# Patient Record
Sex: MALE | Race: WHITE | NOT HISPANIC OR LATINO | Employment: FULL TIME | URBAN - METROPOLITAN AREA
[De-identification: names, ages, dates, MRNs, and addresses within clinical notes are randomized per-mention and may not be internally consistent; named-entity substitution may affect disease eponyms.]

---

## 2017-01-26 ENCOUNTER — OFFICE VISIT (OUTPATIENT)
Dept: FAMILY MEDICINE | Facility: CLINIC | Age: 37
End: 2017-01-26
Payer: COMMERCIAL

## 2017-01-26 VITALS
OXYGEN SATURATION: 97 % | SYSTOLIC BLOOD PRESSURE: 120 MMHG | TEMPERATURE: 97.6 F | BODY MASS INDEX: 29.63 KG/M2 | HEIGHT: 70 IN | DIASTOLIC BLOOD PRESSURE: 76 MMHG | WEIGHT: 207 LBS | HEART RATE: 69 BPM

## 2017-01-26 DIAGNOSIS — Z23 NEED FOR PROPHYLACTIC VACCINATION AND INOCULATION AGAINST INFLUENZA: ICD-10-CM

## 2017-01-26 DIAGNOSIS — J40 BRONCHITIS: ICD-10-CM

## 2017-01-26 DIAGNOSIS — B36.0 TINEA VERSICOLOR: ICD-10-CM

## 2017-01-26 DIAGNOSIS — E78.5 HYPERLIPIDEMIA LDL GOAL <160: ICD-10-CM

## 2017-01-26 DIAGNOSIS — Z00.00 ROUTINE GENERAL MEDICAL EXAMINATION AT A HEALTH CARE FACILITY: Primary | ICD-10-CM

## 2017-01-26 DIAGNOSIS — B35.3 TINEA PEDIS OF BOTH FEET: ICD-10-CM

## 2017-01-26 LAB
CHOLEST SERPL-MCNC: 282 MG/DL
HDLC SERPL-MCNC: 52 MG/DL
LDLC SERPL CALC-MCNC: 200 MG/DL
NONHDLC SERPL-MCNC: 230 MG/DL
TRIGL SERPL-MCNC: 148 MG/DL

## 2017-01-26 PROCEDURE — 80061 LIPID PANEL: CPT | Performed by: FAMILY MEDICINE

## 2017-01-26 PROCEDURE — 36415 COLL VENOUS BLD VENIPUNCTURE: CPT | Performed by: FAMILY MEDICINE

## 2017-01-26 PROCEDURE — 99395 PREV VISIT EST AGE 18-39: CPT | Performed by: FAMILY MEDICINE

## 2017-01-26 PROCEDURE — 90471 IMMUNIZATION ADMIN: CPT | Performed by: FAMILY MEDICINE

## 2017-01-26 PROCEDURE — 90686 IIV4 VACC NO PRSV 0.5 ML IM: CPT | Performed by: FAMILY MEDICINE

## 2017-01-26 RX ORDER — FLUOCINONIDE 0.5 MG/G
CREAM TOPICAL 2 TIMES DAILY
Qty: 15 G | Refills: 0 | Status: SHIPPED | OUTPATIENT
Start: 2017-01-26 | End: 2020-05-27

## 2017-01-26 RX ORDER — KETOCONAZOLE 200 MG/1
200 TABLET ORAL DAILY
Qty: 14 TABLET | Refills: 0 | Status: SHIPPED | OUTPATIENT
Start: 2017-01-26 | End: 2020-05-27

## 2017-01-26 ASSESSMENT — PAIN SCALES - GENERAL: PAINLEVEL: NO PAIN (0)

## 2017-01-26 NOTE — PROGRESS NOTES
Injectable Influenza Immunization Documentation    1.  Is the person to be vaccinated sick today?  No    2. Does the person to be vaccinated have an allergy to eggs or to a component of the vaccine?  No    3. Has the person to be vaccinated today ever had a serious reaction to influenza vaccine in the past?  No    4. Has the person to be vaccinated ever had Guillain-[place] syndrome?  No     Form completed by Ralph DAVALOS      Per orders of Dr. MELO, injection of Influenza given by [facility]. Patient instructed to remain in clinic for 20 minutes afterwards, and to report any adverse reaction to me immediately.

## 2017-01-26 NOTE — PROGRESS NOTES
SUBJECTIVE:     CC: Ricardo Blevins is an 36 year old male who presents for preventative health visit.     Healthy Habits:    Do you get at least three servings of calcium containing foods daily (dairy, green leafy vegetables, etc.)? Most days    Amount of exercise or daily activities, outside of work: Pretty Active    Problems taking medications regularly No    Medication side effects: No    Have you had an eye exam in the past two years? yes    Do you see a dentist twice per year? yes    Do you have sleep apnea, excessive snoring or daytime drowsiness? Yes - Snores      Cold concerns    Today's PHQ-2 Score:   PHQ-2 ( 1999 Pf[employer]) 1/29/2016 3/4/2013   Q1: Little interest or pleasure in doing things 0 0   Q2: Feeling down, depressed or hopeless 0 0   PHQ-2 Score 0 0       Abuse: Current or Past(Physical, Sexual or Emotional)- No  Do you feel safe in your environment - Yes    Social History   Substance Use Topics     Smoking status: Never Smoker      Smokeless tobacco: Never Used     Alcohol Use: Yes      Comment: occ beer     The patient does not drink >3 drinks per day nor >7 drinks per week.    Last PSA: No results found for: PSA    Recent Labs   Lab Test  03/04/13   0919  10/24/11   0941   CHOL  236*  225*   HDL  50  52   LDL  170*  158*   TRIG  85  78   CHOLHDLRATIO  4.8  4.3       Reviewed orders with patient. Reviewed health maintenance and updated orders accordingly - Yes    All Histories reviewed and updated in Epic.  History reviewed. No pertinent past medical history.   Past Surgical History   Procedure Laterality Date     Appendectomy         ROS:  C: NEGATIVE for fever, chills, change in weight  I: NEGATIVE for worrisome rashes, moles or lesions  E: NEGATIVE for vision changes or irritation  ENT: NEGATIVE for ear, mouth and throat problems  R: NEGATIVE for significant cough or SOB  CV: NEGATIVE for chest pain, palpitations or peripheral edema  GI: NEGATIVE for nausea, abdominal pain, heartburn, or  change in bowel habits   male: negative for dysuria, hematuria, decreased urinary stream, erectile dysfunction, urethral discharge  M: NEGATIVE for significant arthralgias or myalgia  N: NEGATIVE for weakness, dizziness or paresthesias  P: NEGATIVE for changes in mood or affect    Problem list, Medication list, Allergies, and Medical/Social/Surgical histories reviewed in EPIC and updated as appropriate.  Labs reviewed in EPIC    BP Readings from Last 3 Encounters:   01/26/17 120/76   05/12/16 133/74   01/29/16 130/84    Wt Readings from Last 3 Encounters:   01/26/17 207 lb (93.895 kg)   05/12/16 215 lb (97.523 kg)   01/29/16 210 lb (95.255 kg)                  Patient Active Problem List   Diagnosis     Hyperlipidemia LDL goal <160     Past Surgical History   Procedure Laterality Date     Appendectomy         Social History   Substance Use Topics     Smoking status: Never Smoker      Smokeless tobacco: Never Used     Alcohol Use: Yes      Comment: occ beer     Family History   Problem Relation Age of Onset     Asthma Maternal Grandmother      CEREBROVASCULAR DISEASE Maternal Grandmother      Breast Cancer Maternal Grandmother      Genitourinary Problems Paternal Grandfather      Glaucoma No family hx of      Macular Degeneration No family hx of      CANCER No family hx of      DIABETES No family hx of      Hypertension No family hx of      Thyroid Disease No family hx of          Current Outpatient Prescriptions   Medication Sig Dispense Refill     clindamycin (CLINDAMAX) 1 % gel Apply topically 2 times daily 60 g 0     tobramycin (TOBREX) 0.3 % OINT ophthalmic ointment Apply 1/2 inch ribbon of ointment 3.5 g 0     fluocinonide (LIDEX) 0.05 % cream Apply topically 2 times daily 15 g 0     Calcium Carbonate-Vit D-Min (CALCIUM 1200 PO) Take  by mouth.       multivitamin, therapeutic with minerals (MULTI-VITAMIN) TABS Take 1 tablet by mouth daily.       ketoconazole (NIZORAL) 200 MG tablet Take 1 tablet by mouth  "daily. 14 tablet 0     No Known Allergies  OBJECTIVE:     /76 mmHg  Pulse 69  Temp(Src) 97.6  F (36.4  C) (Oral)  Ht 5' 10.25\" (1.784 m)  Wt 207 lb (93.895 kg)  BMI 29.50 kg/m2  SpO2 97%  EXAM:  GENERAL: healthy, alert and no distress  EYES: Eyes grossly normal to inspection, PERRL and conjunctivae and sclerae normal  HENT: ear canals and TM's normal, nose and mouth without ulcers or lesions  NECK: no adenopathy, no asymmetry, masses, or scars and thyroid normal to palpation  RESP: lungs clear to auscultation - no rales, rhonchi or wheezes  CV: regular rate and rhythm, normal S1 S2, no S3 or S4, no murmur, click or rub, no peripheral edema and peripheral pulses strong  ABDOMEN: soft, nontender, no hepatosplenomegaly, no masses and bowel sounds normal  MS: no gross musculoskeletal defects noted, no edema  SKIN: no suspicious lesions or rashes  NEURO: Normal strength and tone, mentation intact and speech normal  PSYCH: mentation appears normal, affect normal/bright    ASSESSMENT/PLAN:         ICD-10-CM    1. Routine general medical examination at a health care facility Z00.00    2. Hyperlipidemia LDL goal <160 E78.5 Lipid panel reflex to direct LDL   3. Tinea versicolor B36.0 ketoconazole (NIZORAL) 200 MG tablet     fluocinonide (LIDEX) 0.05 % cream   4. Tinea pedis of both feet B35.3 fluocinonide (LIDEX) 0.05 % cream   5. Bronchitis J40        If patient calls ok for prednisone for 5 days     COUNSELING:  Reviewed preventive health counseling, as reflected in patient instructions       Regular exercise       Healthy diet/nutrition         reports that he has never smoked. He has never used smokeless tobacco.    Estimated body mass index is 30.85 kg/(m^2) as calculated from the following:    Height as of 1/29/16: 5' 10\" (1.778 m).    Weight as of 5/12/16: 215 lb (97.523 kg).       Counseling Resources:  ATP IV Guidelines  Pooled Cohorts Equation Calculator  FRAX Risk Assessment  ICSI Preventive " Guidelines  Dietary Guidelines for Americans, 2010  USDA's MyPlate  ASA Prophylaxis  Lung CA Screening    Umer Thomas MD  [facility]

## 2017-01-26 NOTE — MR AVS SNAPSHOT
After Visit Summary   1/26/2017    Ricardo Blevins    MRN: [id number]           Patient Information     Date Of Birth          1980        Visit Information        Provider Department      1/26/2017 8:40 AM Umer Thomas MD [facility]        Today's Diagnoses     Routine general medical examination at a health care facility    -  1     Hyperlipidemia LDL goal <160         Tinea versicolor         Tinea pedis of both feet         Bronchitis           Care Instructions      Preventive Health Recommendations  Male Ages 26 - 39    Yearly exam:             See your health care provider every year in order to  o   Review health changes.   o   Discuss preventive care.    o   Review your medicines if your doctor has prescribed any.    You should be tested each year for STDs (sexually transmitted diseases), if you re at risk.     After age 35, talk to your provider about cholesterol testing. If you are at risk for heart disease, have your cholesterol tested at least every 5 years.     If you are at risk for diabetes, you should have a diabetes test (fasting glucose).  Shots: Get a flu shot each year. Get a tetanus shot every 10 years.     Nutrition:    Eat at least 5 servings of fruits and vegetables daily.     Eat whole-grain bread, whole-wheat pasta and brown rice instead of white grains and rice.     Talk to your provider about Calcium and Vitamin D.     Lifestyle    Exercise for at least 150 minutes a week (30 minutes a day, 5 days a week). This will help you control your weight and prevent disease.     Limit alcohol to one drink per day.     No smoking.     Wear sunscreen to prevent skin cancer.     See your dentist every six months for an exam and cleaning.             Follow-ups after your visit        Who to contact     If you have questions or need follow up information about today's clinic visit or your schedule please contact [facility]  "directly at [phone].  Normal or non-critical lab and imaging results will be communicated to you by MyChart, letter or phone within 4 business days after the clinic has received the results. If you do not hear from us within 7 days, please contact the clinic through Shopseen[employer] or phone. If you have a critical or abnormal lab result, we will notify you by phone as soon as possible.  Submit refill requests through iCAD or call your pharmacy and they will forward the refill request to us. Please allow 3 business days for your refill to be completed.          Additional Information About Your Visit        Shopseenhart Information     iCAD gives you secure access to your electronic health record. If you see a primary care provider, you can also send messages to your care team and make appointments. If you have questions, please call your primary care clinic.  If you do not have a primary care provider, please call [phone] and they will assist you.        Care EveryWhere ID     This is your Care EveryWhere ID. This could be used by other organizations to access your [facility] medical records  [id number]        Your Vitals Were     Pulse Temperature Height BMI (Body Mass Index) Pulse Oximetry       69 97.6  F (36.4  C) (Oral) 5' 10.25\" (1.784 m) 29.50 kg/m2 97%        Blood Pressure from Last 3 Encounters:   01/26/17 120/76   05/12/16 133/74   01/29/16 130/84    Weight from Last 3 Encounters:   01/26/17 207 lb (93.895 kg)   05/12/16 215 lb (97.523 kg)   01/29/16 210 lb (95.255 kg)              We Performed the Following     Lipid panel reflex to direct LDL          Where to get your medicines      These medications were sent to [facility] - [place], [place] - [address]  [address], [facility] [address]     Phone:  [phone]    - fluocinonide 0.05 % cream  - ketoconazole 200 MG tablet       Primary Care Provider Office Phone # Fax #    Umer Thomas MD " [phone]       [facility] [address]  [facility] [address]        Thank you!     Thank you for choosing [facility]  for your care. Our goal is always to provide you with excellent care. Hearing back from our patients is one way we can continue to improve our services. Please take a few minutes to complete the written survey that you may receive in the mail after your visit with us. Thank you!             Your Updated Medication List - Protect others around you: Learn how to safely use, store and throw away your medicines at www.disposemymeds.org.          This list is accurate as of: 1/26/17  9:40 AM.  Always use your most recent med list.                   Brand Name Dispense Instructions for use    CALCIUM 1200 PO      Take  by mouth.       clindamycin 1 % topical gel    CLINDAMAX    60 g    Apply topically 2 times daily       fluocinonide 0.05 % cream    LIDEX    15 g    Apply topically 2 times daily       ketoconazole 200 MG tablet    NIZORAL    14 tablet    Take 1 tablet (200 mg) by mouth daily       Multi-vitamin Tabs tablet      Take 1 tablet by mouth daily.       tobramycin 0.3 % Oint ophthalmic ointment    TOBREX    3.5 g    Apply 1/2 inch ribbon of ointment

## 2017-01-26 NOTE — NURSING NOTE
"Chief Complaint   Patient presents with     Physical       Initial /76 mmHg  Pulse 69  Temp(Src) 97.6  F (36.4  C) (Oral)  Ht 5' 10.25\" (1.784 m)  Wt 207 lb (93.895 kg)  BMI 29.50 kg/m2  SpO2 97% Estimated body mass index is 29.5 kg/(m^2) as calculated from the following:    Height as of this encounter: 5' 10.25\" (1.784 m).    Weight as of this encounter: 207 lb (93.895 kg).  BP completed using cuff size: regular  Ralph DAVALOS      "

## 2017-01-27 NOTE — PROGRESS NOTES
Quick Note:    Your cholesterols have gone up to their highest level yet.   Based on the pattern, I think you should go on cholesterol lowering meds. These values are very high. As you get older these put you at substantial risk of heart disease. Call if you agree with starting meds. Recheck will depend on what we decide to do.  ______

## 2017-01-31 ENCOUNTER — TELEPHONE (OUTPATIENT)
Dept: FAMILY MEDICINE | Facility: CLINIC | Age: 37
End: 2017-01-31

## 2017-01-31 DIAGNOSIS — J20.9 ACUTE BRONCHITIS, UNSPECIFIED ORGANISM: Primary | ICD-10-CM

## 2017-01-31 RX ORDER — PREDNISONE 20 MG/1
60 TABLET ORAL DAILY
Qty: 15 TABLET | Refills: 0 | Status: SHIPPED | OUTPATIENT
Start: 2017-01-31 | End: 2022-04-19

## 2017-01-31 NOTE — TELEPHONE ENCOUNTER
Notified patient of the message below per PCP.  Patient stated understanding and agreeable with the plan of care. Ene Stoll RN CPC Triage.

## 2017-01-31 NOTE — TELEPHONE ENCOUNTER
Patient came into pharmacy and stated that if he was not feeling better PCP would send in a script for Prednisone.    See OV 1/26/17.    1.  Routine general medical examination at a health care facility  Z00.00      2.  Hyperlipidemia LDL goal <160  E78.5  Lipid panel reflex to direct LDL    3.  Tinea versicolor  B36.0  ketoconazole (NIZORAL) 200 MG tablet        fluocinonide (LIDEX) 0.05 % cream    4.  Tinea pedis of both feet  B35.3  fluocinonide (LIDEX) 0.05 % cream    5.  Bronchitis  J40          If patient calls ok for prednisone for 5 days            Ene Stoll, RN CPC Triage.      Routed to PCP to advise

## 2020-02-23 ENCOUNTER — HEALTH MAINTENANCE LETTER (OUTPATIENT)
Age: 40
End: 2020-02-23

## 2020-05-27 DIAGNOSIS — B36.0 TINEA VERSICOLOR: ICD-10-CM

## 2020-05-27 DIAGNOSIS — B35.3 TINEA PEDIS OF BOTH FEET: ICD-10-CM

## 2020-05-27 RX ORDER — FLUOCINONIDE 0.5 MG/G
CREAM TOPICAL 2 TIMES DAILY
Qty: 15 G | Refills: 0 | Status: SHIPPED | OUTPATIENT
Start: 2020-05-27 | End: 2022-04-19

## 2020-05-27 RX ORDER — KETOCONAZOLE 200 MG/1
200 TABLET ORAL DAILY
Qty: 14 TABLET | Refills: 0 | Status: SHIPPED | OUTPATIENT
Start: 2020-05-27 | End: 2022-04-19

## 2020-11-16 ENCOUNTER — VIRTUAL VISIT (OUTPATIENT)
Dept: FAMILY MEDICINE | Facility: CLINIC | Age: 40
End: 2020-11-16
Payer: COMMERCIAL

## 2020-11-16 ENCOUNTER — TELEPHONE (OUTPATIENT)
Dept: FAMILY MEDICINE | Facility: CLINIC | Age: 40
End: 2020-11-16

## 2020-11-16 DIAGNOSIS — A69.20 ERYTHEMA MIGRANS (LYME DISEASE): Primary | ICD-10-CM

## 2020-11-16 PROCEDURE — 99213 OFFICE O/P EST LOW 20 MIN: CPT | Mod: TEL | Performed by: PHYSICIAN ASSISTANT

## 2020-11-16 RX ORDER — DOXYCYCLINE 100 MG/1
100 CAPSULE ORAL 2 TIMES DAILY
Qty: 42 CAPSULE | Refills: 0 | Status: SHIPPED | OUTPATIENT
Start: 2020-11-16 | End: 2020-12-07

## 2020-11-16 NOTE — TELEPHONE ENCOUNTER
Patient has not been seen since 2017.  He needs a visit.  Patient does not have access anymore for his mychart.  Virtual visit made with Griselda Quinteros.    Patient stated understanding and agreeable with the plan of care. Ene KITCHEN-RN.

## 2020-11-16 NOTE — TELEPHONE ENCOUNTER
Reason for call:  Patient reporting a symptom    Symptom or request: tick bite    Duration (how long have symptoms been present): removed burrowed tick yesterday    Have you been treated for this before? Yes    Additional comments: Patient noticed a burrowed in tick on hip/behind area yesterday and removed tick. Patient advised full tick was removed. Bite spot has redness and welt, also is tender to touch. Patient has no other symptoms. Patient is concerned about Lyme disease and is wondering about antibiotics to treat the bite.     Phone Number patient can be reached at:  Cell number on file:    Telephone Information:   Mobile [phone]       Best Time:  anytime    Can we leave a detailed message on this number:  YES    Call taken on 11/16/2020 at 9:46 AM by Scot Collier

## 2020-11-16 NOTE — PATIENT INSTRUCTIONS
Patient Education     Lyme Disease  Lyme disease is caused by bacteria. The infection is most often passed during the bite of a deer tick. The tick is very small, so many people with Lyme disease don't know they have been bitten. Tests for Lyme disease are not always accurate early in the disease. If the disease is suspected, treatment may start before testing confirms the infection. A long course of antibiotics is the standard treatment.  If untreated, Lyme disease can cause symptoms in many parts of the body that may worsen.    Early symptoms limited to a small area may appear within a few days to a month after the tick bite. These symptoms may include a round, red rash that sometimes looks like a bull's-eye target with darker outer ring and a darker center. There may fever, chills, fatigue, body aches, and headache. In time, the rash goes away, even without treatment. That doesn't mean the infection has gone away, however. In some cases, early local symptoms never develop.    Early body-wide symptoms may appear weeks to months after the bite. These can include rashes on the skin of various parts of the body, muscle aches, fatigue, fever, headache, stiff neck, weakness on one side of the face, dizziness, palpitations, passing out, and joint pain and swelling.    Late-stage symptoms can include weakness in an arm, or leg, headache, fever, and numbness and tingling in the arms or legs, joint pain and swelling, confusion, and memory loss.    Many people will have left over symptoms even after treatment and cure of the Lyme disease. These are called post-Lyme symptoms and may include fatigue, body aches, joint aches, and headaches, which generally improve with time. Repeated courses of antibiotics don't help these symptoms to resolve faster. And, having the symptoms after a course of treatment does not mean that the Lyme bacteria is still active in the body.  Testing is done to check for the bacteria. When the  infection is treated early, it can be cured. In some cases, a second or third course of antibiotics may be needed. Be sure to follow your healthcare providers directions about treatment.  Home care  If antibiotic pills have been prescribed, take them exactly as directed until they are completely gone. Don't stop taking them until you have taken the full course or your healthcare provider has told you to stop.  Ask your healthcare provider about taking over-the-counter medicines to control symptoms such as aches and fever.  Follow-up care  Follow up with your healthcare provider, or as advised. Be sure to return for follow-up testing as directed to be sure the infection has been treated.  When to seek medical advice  Call your healthcare provider right away if any of the following occur:    Current symptoms get worse    Unexplained fever, neck pain or stiffness, or headache    Arm, leg or facial weakness    Joint pain or swelling    Numbness and tingling in the arms or legs    Confusion or memory loss    Irregular or rapid heartbeat, palpitations, dizziness, or passing out  [employer] last reviewed this educational content on 3/1/2018    3954-5916 [employer]. [address] [place] [address], [place] [address]. All rights reserved. This information is not intended as a substitute for professional medical care. Always follow your healthcare professional's instructions.

## 2020-11-16 NOTE — PROGRESS NOTES
"Ricardo Blevins is a 40 year old male who is being evaluated via a billable telephone visit.      The patient has been notified of following:     \"This telephone visit will be conducted via a call between you and your physician/provider. We have found that certain health care needs can be provided without the need for a physical exam.  This service lets us provide the care you need with a short phone conversation.  If a prescription is necessary we can send it directly to your pharmacy.  If lab work is needed we can place an order for that and you can then stop by our lab to have the test done at a later time.    Telephone visits are billed at different rates depending on your insurance coverage. During this emergency period, for some insurers they may be billed the same as an in-person visit.  Please reach out to your insurance provider with any questions.    If during the course of the call the physician/provider feels a telephone visit is not appropriate, you will not be charged for this service.\"    Patient has given verbal consent for Telephone visit?  Yes    What phone number would you like to be contacted at? [phone]    How would you like to obtain your AVS? MyChart    Subjective     Ricardo Blevins is a 40 year old male who presents via phone visit today for the following health issues:    HPI        Concern - Tick Bite  Onset: patient is unsure how long it had been there. Patient been around deer 2-3 days ago.   Description: Patient found tick yesterday and removed it. There is a welt and ring around the bite.   Intensity: mild  Progression of Symptoms:  same  Accompanying Signs & Symptoms: welt, reddish/purlish ring around welt  Previous history of similar problem: No, patient has had tick bites before  Precipitating factors:        Worsened by: nothing  Alleviating factors:        Improved by: nothing  Therapies tried and outcome: nothing    Patient was hunting last week then butchering deer a few days " later. He did see ticks when he was out hunting, but didn't notice anything until Friday. Right hip/buttocks area. Patient removed engorged tick - feels he removed the entire tick. Red ring around where tic was, continues to be mildly sore.  Denies fever or chills.              Review of Systems   Constitutional, HEENT, cardiovascular, pulmonary, gi and gu systems are negative, except as otherwise noted.       Objective          Vitals:  No vitals were obtained today due to virtual visit.    healthy, alert and no distress  PSYCH: Alert and oriented times 3; coherent speech, normal   rate and volume, able to articulate logical thoughts, able   to abstract reason, no tangential thoughts, no hallucinations   or delusions  His affect is normal  RESP: No cough, no audible wheezing, able to talk in full sentences  Remainder of exam unable to be completed due to telephone visits            Assessment/Plan:    Assessment & Plan     Erythema migrans (Lyme disease)  Will treat as lyme disease due to presence of erythema migrans and visible engorged tick removed from patients hip. Advised of side effects of medication. Patient has no further questions.   - doxycycline hyclate (VIBRAMYCIN) 100 MG capsule; Take 1 capsule (100 mg) by mouth 2 times daily for 21 days        Return if symptoms worsen or fail to improve.    Griselda Quinteros PA-C  [employer] [facility]    Phone call duration:  12 minutes

## 2020-12-12 ENCOUNTER — HEALTH MAINTENANCE LETTER (OUTPATIENT)
Age: 40
End: 2020-12-12

## 2021-04-11 ENCOUNTER — HEALTH MAINTENANCE LETTER (OUTPATIENT)
Age: 41
End: 2021-04-11

## 2021-09-26 ENCOUNTER — HEALTH MAINTENANCE LETTER (OUTPATIENT)
Age: 41
End: 2021-09-26

## 2022-04-19 ENCOUNTER — OFFICE VISIT (OUTPATIENT)
Dept: FAMILY MEDICINE | Facility: CLINIC | Age: 42
End: 2022-04-19
Payer: COMMERCIAL

## 2022-04-19 VITALS
HEART RATE: 69 BPM | BODY MASS INDEX: 32.3 KG/M2 | HEIGHT: 70 IN | OXYGEN SATURATION: 96 % | SYSTOLIC BLOOD PRESSURE: 121 MMHG | DIASTOLIC BLOOD PRESSURE: 82 MMHG | TEMPERATURE: 98 F | WEIGHT: 225.6 LBS

## 2022-04-19 DIAGNOSIS — Z11.59 NEED FOR HEPATITIS C SCREENING TEST: ICD-10-CM

## 2022-04-19 DIAGNOSIS — E78.5 HYPERLIPIDEMIA LDL GOAL <160: ICD-10-CM

## 2022-04-19 DIAGNOSIS — Z23 NEED FOR VACCINATION: ICD-10-CM

## 2022-04-19 DIAGNOSIS — Z00.00 WELL ADULT EXAM: Primary | ICD-10-CM

## 2022-04-19 DIAGNOSIS — Z23 HIGH PRIORITY FOR 2019-NCOV VACCINE: ICD-10-CM

## 2022-04-19 DIAGNOSIS — Z11.4 SCREENING FOR HIV (HUMAN IMMUNODEFICIENCY VIRUS): ICD-10-CM

## 2022-04-19 DIAGNOSIS — Z23 NEED FOR VACCINE FOR DT (DIPHTHERIA-TETANUS): ICD-10-CM

## 2022-04-19 DIAGNOSIS — Z13.220 SCREENING FOR HYPERLIPIDEMIA: ICD-10-CM

## 2022-04-19 LAB
ALBUMIN SERPL-MCNC: 4.4 G/DL (ref 3.4–5)
ALP SERPL-CCNC: 64 U/L (ref 40–150)
ALT SERPL W P-5'-P-CCNC: 69 U/L (ref 0–70)
ANION GAP SERPL CALCULATED.3IONS-SCNC: 5 MMOL/L (ref 3–14)
AST SERPL W P-5'-P-CCNC: 33 U/L (ref 0–45)
BILIRUB SERPL-MCNC: 0.5 MG/DL (ref 0.2–1.3)
BUN SERPL-MCNC: 17 MG/DL (ref 7–30)
CALCIUM SERPL-MCNC: 9.9 MG/DL (ref 8.5–10.1)
CHLORIDE BLD-SCNC: 105 MMOL/L (ref 94–109)
CHOLEST SERPL-MCNC: 287 MG/DL
CO2 SERPL-SCNC: 27 MMOL/L (ref 20–32)
CREAT SERPL-MCNC: 0.95 MG/DL (ref 0.66–1.25)
FASTING STATUS PATIENT QL REPORTED: YES
GFR SERPL CREATININE-BSD FRML MDRD: >90 ML/MIN/1.73M2
GLUCOSE BLD-MCNC: 103 MG/DL (ref 70–99)
HBA1C MFR BLD: 5.7 % (ref 0–5.6)
HDLC SERPL-MCNC: 51 MG/DL
LDLC SERPL CALC-MCNC: 202 MG/DL
NONHDLC SERPL-MCNC: 236 MG/DL
POTASSIUM BLD-SCNC: 4.7 MMOL/L (ref 3.4–5.3)
PROT SERPL-MCNC: 7.8 G/DL (ref 6.8–8.8)
SODIUM SERPL-SCNC: 137 MMOL/L (ref 133–144)
TRIGL SERPL-MCNC: 172 MG/DL

## 2022-04-19 PROCEDURE — 80053 COMPREHEN METABOLIC PANEL: CPT | Performed by: FAMILY MEDICINE

## 2022-04-19 PROCEDURE — 99396 PREV VISIT EST AGE 40-64: CPT | Mod: 25 | Performed by: FAMILY MEDICINE

## 2022-04-19 PROCEDURE — 90471 IMMUNIZATION ADMIN: CPT | Performed by: FAMILY MEDICINE

## 2022-04-19 PROCEDURE — 0051A COVID-19,PF,PFIZER (12+ YRS): CPT | Performed by: FAMILY MEDICINE

## 2022-04-19 PROCEDURE — 86803 HEPATITIS C AB TEST: CPT | Performed by: FAMILY MEDICINE

## 2022-04-19 PROCEDURE — 80061 LIPID PANEL: CPT | Performed by: FAMILY MEDICINE

## 2022-04-19 PROCEDURE — 36415 COLL VENOUS BLD VENIPUNCTURE: CPT | Performed by: FAMILY MEDICINE

## 2022-04-19 PROCEDURE — 90715 TDAP VACCINE 7 YRS/> IM: CPT | Performed by: FAMILY MEDICINE

## 2022-04-19 PROCEDURE — 91305 COVID-19,PF,PFIZER (12+ YRS): CPT | Performed by: FAMILY MEDICINE

## 2022-04-19 PROCEDURE — 87389 HIV-1 AG W/HIV-1&-2 AB AG IA: CPT | Performed by: FAMILY MEDICINE

## 2022-04-19 PROCEDURE — 83036 HEMOGLOBIN GLYCOSYLATED A1C: CPT | Performed by: FAMILY MEDICINE

## 2022-04-19 ASSESSMENT — ENCOUNTER SYMPTOMS
SORE THROAT: 0
COUGH: 0
JOINT SWELLING: 0
EYE PAIN: 0
NERVOUS/ANXIOUS: 0
FREQUENCY: 0
PARESTHESIAS: 0
HEADACHES: 0
HEARTBURN: 0
ABDOMINAL PAIN: 0
MYALGIAS: 0
FEVER: 0
ARTHRALGIAS: 0
HEMATURIA: 0
PALPITATIONS: 0
WEAKNESS: 0
NAUSEA: 0
CHILLS: 0
HEMATOCHEZIA: 0
CONSTIPATION: 0
DIZZINESS: 0
SHORTNESS OF BREATH: 0
DYSURIA: 0
DIARRHEA: 0

## 2022-04-19 NOTE — PATIENT INSTRUCTIONS
Healthy Lifestyle   Nutrition and healthy eating: The Mediterranean Diet  Ready to switch to a more heart-healthy diet? Here's how to get started with the Mediterranean diet.  By [facility] Staff   If you're looking for a heart-healthy eating plan, the Mediterranean diet might be right for you.  The Mediterranean diet blends the basics of healthy eating with the traditional flavors and cooking methods of the Mediterranean.  Interest in the Mediterranean diet began in the 1960s with the observation that coronary heart disease caused fewer deaths in Mediterranean countries, such as [place] and [place], than in the [place] and northern [place]. Subsequent studies found that the Mediterranean diet is associated with reduced risk factors for cardiovascular disease.  The Mediterranean diet is one of the healthy eating plans recommended by the Dietary Guidelines for Americans to promote health and prevent chronic disease.  It is also recognized by the World Health Organization as a healthy and sustainable dietary pattern and as an intangible cultural asset by the United National Educational, Scientific and Cultural Organization.  The Mediterranean diet is a way of eating based on the traditional cuisine of countries bordering the [place] Sea. While there is no single definition of the Mediterranean diet, it is typically high in vegetables, fruits, whole grains, beans, nut and seeds, and olive oil.  The main components of Mediterranean diet include:  Daily consumption of vegetables, fruits, whole grains and healthy fats   Weekly intake of fish, poultry, beans and eggs   Moderate portions of dairy products   Limited intake of red meat  Other important elements of the Mediterranean diet are sharing meals with family and friends, enjoying a glass of red wine and being physically active.  The foundation of the Mediterranean diet is vegetables, fruits, herbs, nuts, beans and whole grains. Meals are built around these  "plant-based foods. Moderate amounts of dairy, poultry and eggs are also central to the Mediterranean Diet, as is seafood. In contrast, red meat is eaten only occasionally.  Healthy fats are a mainstay of the Mediterranean diet. They're eaten instead of less healthy fats, such as saturated and trans fats, which contribute to heart disease.  Olive oil is the primary source of added fat in the Mediterranean diet. Olive oil provides monounsaturated fat, which has been found to lower total cholesterol and low-density lipoprotein (LDL or \"bad\") cholesterol levels. Nuts and seeds also contain monounsaturated fat.  Fish are also important in the Mediterranean diet. Fatty fish -- such as mackerel, herring, sardines, albacore tuna, salmon and lake trout -- are rich in omega-3 fatty acids, a type of polyunsaturated fat that may reduce inflammation in the body. Omega-3 fatty acids also help decrease triglycerides, reduce blood clotting, and decrease the risk of stroke and heart failure.  The Mediterranean diet typically allows red wine in moderation. Although alcohol has been associated with a reduced risk of heart disease in some studies, it's by no means risk free. The Dietary Guidelines for Americans caution against beginning to drink or drinking more often on the basis of potential health benefits.  Interested in trying the Mediterranean diet? These tips will help you get started:  Eat more fruits and vegetables. Aim for 7 to 10 servings a day of fruit and vegetables.   Opt for whole grains. Switch to whole-grain bread, cereal and pasta. [place] with other whole grains, such as bulgur and farro.   Use healthy fats. Try olive oil as a replacement for butter when cooking. Instead of putting butter or margarine on bread, try dipping it in flavored olive oil.   Eat more seafood. Eat fish twice a week. Fresh or water-packed tuna, salmon, trout, mackerel and herring are healthy choices. Grilled fish tastes good and requires " little cleanup. Avoid deep-fried fish.   Reduce red meat. Substitute fish, poultry or beans for meat. If you eat meat, make sure it's lean and keep portions small.   Enjoy some dairy. Eat low-fat Greek or plain yogurt and small amounts of a variety of cheeses.   Spice it up. Herbs and spices boost flavor and lessen the need for salt.  The Mediterranean diet is a delicious and healthy way to eat. Many people who switch to this style of eating say they'll never eat any other way.

## 2022-04-19 NOTE — PROGRESS NOTES
SUBJECTIVE:   CC: Ricardo Blevins is an 41 year old male who presents for preventative health visit.   Patient has been advised of split billing requirements and indicates understanding: Yes  Healthy Habits:     Getting at least 3 servings of Calcium per day:  Yes    Bi-annual eye exam:  Yes    Dental care twice a year:  Yes    Sleep apnea or symptoms of sleep apnea:  Excessive snoring    Diet:  Regular (no restrictions)    Frequency of exercise:  2-3 days/week    Duration of exercise:  15-30 minutes    Taking medications regularly:  Not Applicable    Medication side effects:  None    PHQ-2 Total Score: 0    Additional concerns today:  No    BP Readings from Last 6 Encounters:   04/19/22 121/82   01/26/17 120/76   05/12/16 133/74   01/29/16 130/84   03/04/13 104/64   10/24/11 127/75     Wt Readings from Last 4 Encounters:   04/19/22 102.3 kg (225 lb 9.6 oz)   01/26/17 93.9 kg (207 lb)   05/12/16 97.5 kg (215 lb)   01/29/16 95.3 kg (210 lb)     Needs COVID vaccine to go to [facility]          Today's PHQ-2 Score:   PHQ-2 ( 1999 Pfizer) 4/19/2022   Q1: Little interest or pleasure in doing things 0   Q2: Feeling down, depressed or hopeless 0   PHQ-2 Score 0   Q1: Little interest or pleasure in doing things Not at all   Q2: Feeling down, depressed or hopeless Not at all   PHQ-2 Score 0       Abuse: Current or Past(Physical, Sexual or Emotional)- No  Do you feel safe in your environment? Yes    Have you ever done Advance Care Planning? (For example, a Health Directive, POLST, or a discussion with a medical provider or your loved ones about your wishes): No, advance care planning information given to patient to review.  Patient declined advance care planning discussion at this time.    Social History     Tobacco Use     Smoking status: Never Smoker     Smokeless tobacco: Never Used   Substance Use Topics     Alcohol use: Yes     Comment: occ beer     If you drink alcohol do you typically have >3 drinks per day or >7 drinks per  "week? No    Alcohol Use 4/19/2022   Prescreen: >3 drinks/day or >7 drinks/week? No   Prescreen: >3 drinks/day or >7 drinks/week? -   No flowsheet data found.    Last PSA: No results found for: PSA    Reviewed orders with patient. Reviewed health maintenance and updated orders accordingly - Yes  BP Readings from Last 3 Encounters:   04/19/22 121/82   01/26/17 120/76   05/12/16 133/74    Wt Readings from Last 3 Encounters:   04/19/22 102.3 kg (225 lb 9.6 oz)   01/26/17 93.9 kg (207 lb)   05/12/16 97.5 kg (215 lb)                    Reviewed and updated as needed this visit by clinical staff   Tobacco  Allergies  Meds                Reviewed and updated as needed this visit by Provider                       Review of Systems   Constitutional: Negative for chills and fever.   HENT: Negative for congestion, ear pain, hearing loss and sore throat.    Eyes: Negative for pain and visual disturbance.   Respiratory: Negative for cough and shortness of breath.    Cardiovascular: Negative for chest pain, palpitations and peripheral edema.   Gastrointestinal: Negative for abdominal pain, constipation, diarrhea, heartburn, hematochezia and nausea.   Genitourinary: Negative for dysuria, frequency, genital sores, hematuria, impotence, penile discharge and urgency.   Musculoskeletal: Negative for arthralgias, joint swelling and myalgias.   Skin: Negative for rash.   Neurological: Negative for dizziness, weakness, headaches and paresthesias.   Psychiatric/Behavioral: Negative for mood changes. The patient is not nervous/anxious.          OBJECTIVE:   /82   Pulse 69   Temp 98  F (36.7  C) (Oral)   Ht 1.786 m (5' 10.32\")   Wt 102.3 kg (225 lb 9.6 oz)   SpO2 96%   BMI 32.08 kg/m      Physical Exam  GENERAL: healthy, alert and no distress  EYES: Eyes grossly normal to inspection, PERRL and conjunctivae and sclerae normal  HENT: ear canals and TM's normal, nose and mouth without ulcers or lesions  NECK: no adenopathy, no " "asymmetry, masses, or scars and thyroid normal to palpation  RESP: lungs clear to auscultation - no rales, rhonchi or wheezes  CV: regular rate and rhythm, normal S1 S2, no S3 or S4, no murmur, click or rub, no peripheral edema and peripheral pulses strong  MS: no gross musculoskeletal defects noted, no edema  SKIN: no suspicious lesions or rashes  NEURO: Normal strength and tone, mentation intact and speech normal  PSYCH: mentation appears normal, affect normal/bright        ASSESSMENT/PLAN:       ICD-10-CM    1. Well adult exam  Z00.00 REVIEW OF HEALTH MAINTENANCE PROTOCOL ORDERS     Comprehensive metabolic panel     Comprehensive metabolic panel   2. Screening for HIV (human immunodeficiency virus)  Z11.4 HIV Antigen Antibody Combo     HIV Antigen Antibody Combo   3. Need for hepatitis C screening test  Z11.59 Hepatitis C Screen Reflex to HCV RNA Quant and Genotype     Hepatitis C Screen Reflex to HCV RNA Quant and Genotype   4. Screening for hyperlipidemia  Z13.220 Lipid panel reflex to direct LDL Fasting     Lipid panel reflex to direct LDL Fasting   5. Hyperlipidemia LDL goal <160  E78.5    6. Need for vaccine for DT (diphtheria-tetanus)  Z23    7. High priority for 2019-nCoV vaccine  Z23 COVID-19,PF,PFIZER (12+ Yrs GRAY LABEL)     CANCELED: COVID-19,PF,PFIZER (12+ Yrs GRAY LABEL)   8. Need for vaccination  Z23 TDAP VACCINE (Adacel, Boostrix)  [[id number]]     CANCELED: DTaP IMMUNIZATION, IM [INFANRIX]   9. BMI 32.0-32.9,adult  Z68.32 Hemoglobin A1c     Hemoglobin A1c         Patient has been advised of split billing requirements and indicates understanding: Yes    COUNSELING:   Reviewed preventive health counseling, as reflected in patient instructions       Regular exercise       Healthy diet/nutrition    Estimated body mass index is 32.08 kg/m  as calculated from the following:    Height as of this encounter: 1.786 m (5' 10.32\").    Weight as of this encounter: 102.3 kg (225 lb 9.6 oz).     Weight management " plan: Discussed healthy diet and exercise guidelines    He reports that he has never smoked. He has never used smokeless tobacco.      Counseling Resources:  ATP IV Guidelines  Pooled Cohorts Equation Calculator  FRAX Risk Assessment  ICSI Preventive Guidelines  Dietary Guidelines for Americans, 2010  USDA's MyPlate  ASA Prophylaxis  Lung CA Screening    Austin Thompson MD  [facility]

## 2022-04-20 DIAGNOSIS — E78.5 HYPERLIPIDEMIA LDL GOAL <100: Primary | ICD-10-CM

## 2022-04-20 LAB
HCV AB SERPL QL IA: NONREACTIVE
HIV 1+2 AB+HIV1 P24 AG SERPL QL IA: NONREACTIVE

## 2022-04-20 RX ORDER — ATORVASTATIN CALCIUM 20 MG/1
20 TABLET, FILM COATED ORAL DAILY
Qty: 90 TABLET | Refills: 0 | Status: SHIPPED | OUTPATIENT
Start: 2022-04-20

## 2022-05-08 ENCOUNTER — HEALTH MAINTENANCE LETTER (OUTPATIENT)
Age: 42
End: 2022-05-08

## 2022-05-10 ENCOUNTER — TELEPHONE (OUTPATIENT)
Dept: FAMILY MEDICINE | Facility: CLINIC | Age: 42
End: 2022-05-10

## 2022-05-10 ENCOUNTER — IMMUNIZATION (OUTPATIENT)
Dept: NURSING | Facility: CLINIC | Age: 42
End: 2022-05-10
Attending: FAMILY MEDICINE
Payer: COMMERCIAL

## 2022-05-10 DIAGNOSIS — B36.0 TINEA VERSICOLOR: ICD-10-CM

## 2022-05-10 DIAGNOSIS — Z23 HIGH PRIORITY FOR 2019-NCOV VACCINE: Primary | ICD-10-CM

## 2022-05-10 PROCEDURE — 99207 PR NO CHARGE LOS: CPT

## 2022-05-10 PROCEDURE — 91305 COVID-19,PF,PFIZER (12+ YRS): CPT

## 2022-05-10 PROCEDURE — 0052A COVID-19,PF,PFIZER (12+ YRS): CPT

## 2022-05-10 NOTE — TELEPHONE ENCOUNTER
Pharmacy requesting refill on Ketoconazole 200mg tabs 200, Sig: take 1 tablet by mouth once daily, qty #: 14.    Medication not found on medication list.

## 2022-05-10 NOTE — TELEPHONE ENCOUNTER
Routing below refill request to Dr. Molina, as med is not active on med list.  Was previously prescribed by Dr. Umer Thomas.  Patient saw you for a well visit on 4/19.    Gibran Michaels RN

## 2022-05-12 RX ORDER — KETOCONAZOLE 200 MG/1
200 TABLET ORAL DAILY
Qty: 14 TABLET | Refills: 0 | Status: SHIPPED | OUTPATIENT
Start: 2022-05-12

## 2023-01-08 ENCOUNTER — HEALTH MAINTENANCE LETTER (OUTPATIENT)
Age: 43
End: 2023-01-08

## 2023-04-14 ENCOUNTER — TELEPHONE (OUTPATIENT)
Dept: FAMILY MEDICINE | Facility: CLINIC | Age: 43
End: 2023-04-14
Payer: COMMERCIAL

## 2023-04-14 NOTE — TELEPHONE ENCOUNTER
Medication Question or Refill        What medication are you calling about (include dose and sig)?: In regards to tick bite medication    Preferred Pharmacy:   [facility] [place]  [address], [address], [place],[address]  [phone]      Controlled Substance Agreement on file:   CSA -- Patient Level:    CSA: None found at the patient level.       Who prescribed the medication?: Griselda Quinteros    Do you need a refill? Yes    When did you use the medication last? A few years ago, pt was seen in 2020    Patient offered an appointment? No    Do you have any questions or concerns?  No      Could we send this information to you in [facility] or would you prefer to receive a phone call?:   Patient would prefer a phone call   Okay to leave a detailed message?: Yes at Cell number on file:    Telephone Information:   Mobile [phone]

## 2023-04-14 NOTE — TELEPHONE ENCOUNTER
"Called patient. He states that he was out in the woods turkey hunting and then found a tick imbedded in his back. He got it out, but now there is a welt and it is red around it. He denies having any fever currently. Advised scheduling an appointment for treatment and offered to help schedule a virtual or in person appointment. Patient responded that he doesn't understand why he would need an appointment and asked \"wouldn't you want to treat that.\" Explained that the visit would be so that the physician can give treatment.   Patient is requesting Rx to be sent to pharmacy.     Courtney Barriga RN   [facility]  "

## 2023-04-17 NOTE — TELEPHONE ENCOUNTER
Called patient and relayed information from covering provider. Patient states that he does not see the point of coming in for appointment since tom has since disappeared. Writer provided education and discussed that it is still important to be seen if a rash had been present and he believed it was a deer tick. Patient notes that he was previously on Vibramycin on 11/16/20 for same scenario, writer discussed that it can be a frustrating process, but it is important for providers to assess patients before prescribing medications to best treat the patient. Writer discussed ease of E-visit and described how to complete. Patient states understanding. No further questions.    JEN GarlandN RN  [facility]

## 2023-04-17 NOTE — TELEPHONE ENCOUNTER
Griselda is out of the clinic this week. I would advise a visit in clinic or E visit to treat.   Aileen Jones PA-C

## 2023-04-17 NOTE — TELEPHONE ENCOUNTER
"Griselda,    Patient stated he was seen for this in past by you and \"was told if it happens again to call right away to get treatment so I am trying to be proactive and I don't know what coming into the clinic will do because its been since last Thursday and now the tom is mostly gone\".     I attempted to provide edu and help schedule regardless, however, pt adamant about asking Griselda for advise.     Thanks,  KENNY Paz  [facility]     "

## 2023-06-02 ENCOUNTER — HEALTH MAINTENANCE LETTER (OUTPATIENT)
Age: 43
End: 2023-06-02

## 2023-11-07 ENCOUNTER — VIRTUAL VISIT (OUTPATIENT)
Dept: FAMILY MEDICINE | Facility: CLINIC | Age: 43
End: 2023-11-07
Payer: COMMERCIAL

## 2023-11-07 DIAGNOSIS — S20.462A TICK BITE OF LEFT BACK WALL OF THORAX, INITIAL ENCOUNTER: Primary | ICD-10-CM
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
## 2023-11-07 PROCEDURE — 99213 OFFICE O/P EST LOW 20 MIN: CPT | Mod: VID | Performed by: PHYSICIAN ASSISTANT

## 2023-11-07 RX ORDER — DOXYCYCLINE 100 MG/1
100 CAPSULE ORAL 2 TIMES DAILY
Qty: 20 CAPSULE | Refills: 0 | Status: SHIPPED | OUTPATIENT
Start: 2023-11-07 | End: 2023-11-17

## 2023-11-07 NOTE — PROGRESS NOTES
Ricardo is a 43 year old who is being evaluated via a billable video visit.      How would you like to obtain your AVS? MyChart  If the video visit is dropped, the invitation should be resent by: Text to cell phone: [phone]  Will anyone else be joining your video visit? No          Assessment & Plan     Tick bite of left back wall of thorax, initial encounter  Given time, type of tick and rash will treat.  Follow up as needed.  Encouraged physical.    - doxycycline hyclate (VIBRAMYCIN) 100 MG capsule; Take 1 capsule (100 mg) by mouth 2 times daily for 10 days                 JORDIN [facility] [place]    Subjective   Ricardo is a 43 year old, presenting for the following health issues:  Insect Bites      11/7/2023     1:54 PM   Additional Questions   Roomed by Gabriela   Accompanied by self       History of Present Illness       Reason for visit:  Deer tick removed and left a big welt. Want to make sure I take any medication for possible lymes.    He eats 2-3 servings of fruits and vegetables daily.He consumes 1 sweetened beverage(s) daily.He exercises with enough effort to increase his heart rate 20 to 29 minutes per day.  He exercises with enough effort to increase his heart rate 3 or less days per week.   He is taking medications regularly.     Was hunting this weekend and got a tick attached.  There is a welt where the bite is.  Tick noticed today-could have been on since Sat.  Tick was engorged.   The lesion is on his upper side/back           Review of Systems   As above      Objective           Vitals:  No vitals were obtained today due to virtual visit.    Physical Exam   GENERAL: Healthy, alert and no distress  EYES: Eyes grossly normal to inspection.  No discharge or erythema, or obvious scleral/conjunctival abnormalities.  RESP: No audible wheeze, cough, or visible cyanosis.  No visible retractions or increased work of breathing.    SKIN: red lesion that is darker in  the center and lighter around the sides   NEURO: Cranial nerves grossly intact.  Mentation and speech appropriate for age.  PSYCH: Mentation appears normal, affect normal/bright, judgement and insight intact, normal speech and appearance well-groomed.                Video-Visit Details    Type of service:  Video Visit     Originating Location (pt. Location): Home    Distant Location (provider location):  Off-site  Platform used for Video Visit: Sendori

## 2023-11-07 NOTE — COMMUNITY RESOURCES LIST (ENGLISH)
11/07/2023   [facility]  N/A  For additional resource needs, please contact your health insurance member services or your primary care team.  Phone: [phone]   Email: N/A   Address: [address] [place] [address]   Hours: N/A        Hotlines and Helplines       Hotline - Housing crisis  1  [facility] Distance: 17.46 miles      Phone/Virtual   [address] [place] [address]  Language: English  Hours: Mon - Sun Open 24 Hours  Fees: Free   Phone: (828) 445-8391 Email: info@saintolaf.org Website: http://www.saintolaf.org/     2  [facility] Distance: 17.92 miles      Phone/Virtual   [address] [place] [address]  Language: English  Hours: Mon - Sun Open 24 Hours   Phone: (147) 341-5371 Email: info@[employer] Website: http://www.Dropmysite.org          Housing       Coordinated Entry access point  3  [facility]  Office - [facility] Distance: 10.88 miles      Phone/Virtual   [address] [address]  Language: English  Hours: Mon - Fri 8:30 AM - 12:00 PM , Mon - Fri 1:00 PM - 4:00 PM  Fees: Free   Phone: (739) 350-7730 Ext.2 Email: iman@[facility].[employer].org Website: https://www.Sydney Seed Fundusa.org/usn/     4  [facility] ([facility]) Distance: 16.8 miles      In-Person, Phone/Virtual   [address] [place] [address]  Language: English, Irish  Hours: Mon - Fri 10:00 AM - 5:30 PM , Mon - Sun 7:30 PM - 10:20 PM , Sat - Sun 1:00 PM - 5:30 PM  Fees: Free   Phone: (664) 783-7286 Email: info@[employer]opia Website: https://www.[employer].org/our-programs/adult-shelter-connect-ma-shelter/     Drop-in center or day shelter  5  [facility] Distance: 16.63 miles      In-Person   [address] [place] [address]  Language: English, Hmong, Sammarinese, Irish  Hours: Mon - u 8:30 AM - 4:30 PM , Sat - Sun 9:00 AM - 12:00 PM  Fees:  Free   Phone: (937) 109-3829 Email: info@[employer] Website: https://[employer].org/     6  [facility] Distance: 18.11 miles      In-Person   [address] [place] [address]  Language: English  Hours: Mon - Fri 12:00 PM - 3:00 PM  Fees: Free   Phone: (480) 261-4681 Email: LikeAndy@[employer] Website: http://LikeAndy.[employer]/     Housing search assistance  7  [employer] - Online housing search assistance Distance: 16.69 miles      Phone/Virtual   [address] [address]  Language: English, Hmong, South Sudanese, Azeri  Hours: Mon - Sun Open 24 Hours   Phone: (818) 694-3254 Email: info@Employee Benefit Planslink.org Website: http://www.Employee Benefit Planslink.org/     8  Affordable Housing Online - https://Advaxis/ Distance: 17.41 miles      Phone/Virtual   [address] [place] [address]  Language: English  Hours: Mon - Sun Open 24 Hours   Email: info@i[employer]K Website: https://Advaxis     Shelter for families  9  [facility] Distance: 22.15 miles      In-Person   [address] [place] [address]  Language: English  Hours: Mon - Fri 3:00 PM - 9:00 AM , Sat - Sun Open 24 Hours  Fees: Free   Phone: (415) 587-4485 Ext.1 Website: https://www.saintandrews.org/2020/07/03/emergency-family-shelter/     Shelter for individuals  10  [facility] [place] [facility] [place] Distance: 16.84 miles      In-Person   [address] [place] [address]  Language: English  Hours: Mon - Sun 5:00 PM - 10:00 AM  Fees: Free, Self Pay   Phone: (664) 139-2414 Email: info@[employer] Website: https://www.[employer].org/locations/higher-ground-shelter/     11  [facility] Distance: 17.01 miles      In-Person   [address] [place] [address]  Language: English  Hours: Mon - Fri 4:00 PM - 9:00 AM  Fees: Free   Phone:  (557) 991-7577 Email: ruthy@[facility].salvationarmy.org Website: https://[facility].salvationarmy.org/[facility] Hospital[facility]/          Important Numbers & Websites       [place] [address] 211itedway.org  Poison Control   (848) 951-5184 [place]  Suicide and Crisis Lifeline   [address] [place], [place] [address]line.org  Childhelp [place] Child Abuse Hotline   431.379.9782 Childhelphotline.org  National Sexual Assault Hotline   (144) 185-5279 (HOPE) Rainn.org  [place] Runaway Safeline   (382) 872-7899 (RUNAWAY) 1800runaway.org  Pregnancy & Postpartum Support [place]   Call/text 204-769-6743 Ppsupport[place].org  Substance Abuse National Helpline ([facility]   257-885-HELP (9790) Findtreatment.gov  Emergency Services   913

## 2024-06-07 ENCOUNTER — TELEPHONE (OUTPATIENT)
Dept: FAMILY MEDICINE | Facility: CLINIC | Age: 44
End: 2024-06-07
Payer: COMMERCIAL

## 2024-06-07 DIAGNOSIS — B36.0 TINEA VERSICOLOR: ICD-10-CM

## 2024-06-07 NOTE — TELEPHONE ENCOUNTER
"Shasta,     Patient called stated, \"I was hoping to have my medication refilled\", \"I usually need it this time of the year\". Referring to ketoconazole. Per history uses for tinea versicolor and eczema.     I am not sending to refill pool due to this was entered as \"patient reported not taking\" so they would route it to you anyway.     Thanks,  KENNY Paz  [facility]     "

## 2024-06-07 NOTE — TELEPHONE ENCOUNTER
Generally, topical selsun blue shampoo lathered to skin is recommended first to avoid possible medication side effects of liver toxicity. May hold for PCP

## 2024-06-07 NOTE — TELEPHONE ENCOUNTER
Patient reports using the selsun blue shampoo to skin it was not effective    Ok for waiting for PCP    Kandace Viera RN  [facility]

## 2024-06-10 RX ORDER — KETOCONAZOLE 200 MG/1
200 TABLET ORAL DAILY
Qty: 14 TABLET | Refills: 0 | OUTPATIENT
Start: 2024-06-10

## 2024-06-13 NOTE — TELEPHONE ENCOUNTER
Patient returning call. RN relayed providers message. Patient stated he does not want to have a visit for something he has already been seen for previously and when he knows the treatment that works and treats this.       RN relayed patient's listed PCP has not seen him for this issue. Patient stated he does not remember seeing Shasta Galvan or having her be listed as PCP.     RN advised visit is likely needed and suggested an e-visit possibly if this is in regards to rash/skin issue. Patient declined and stated he has requested this medication every year and normally does not have any issue getting it sent in.     Patient going out of town tomorrow and was hoping to have this prescribed.     Please advise.       Belgica Pond RN on 6/13/2024 at 1:24 PM

## 2024-06-14 NOTE — TELEPHONE ENCOUNTER
Routing to team to please let patient know that he would need to establish care with a provider to manage his cares/meds.   He currently does not have a provider to do so.   He can consult with his local pharmacist for OTC recommendations in the meantime    Sina Liu RN  [facility]

## 2024-06-14 NOTE — TELEPHONE ENCOUNTER
For the reasons below-he doesn't remember seeing me and doesn't want me to be his pcp will need to find a new pcp and will need a visit to establish care and get meds refilled.    If he doesn't agree with this he can go to [facility], urgent care or buy over the counter antifungal cream.      Shasta Galvan PA-C

## 2024-06-29 ENCOUNTER — HEALTH MAINTENANCE LETTER (OUTPATIENT)
Age: 44
End: 2024-06-29

## 2025-07-13 ENCOUNTER — HEALTH MAINTENANCE LETTER (OUTPATIENT)
Age: 45
End: 2025-07-13